# Patient Record
Sex: FEMALE | URBAN - METROPOLITAN AREA
[De-identification: names, ages, dates, MRNs, and addresses within clinical notes are randomized per-mention and may not be internally consistent; named-entity substitution may affect disease eponyms.]

---

## 2023-07-07 ENCOUNTER — CLINICAL DOCUMENTATION (OUTPATIENT)
Dept: PHARMACY | Facility: CLINIC | Age: 33
End: 2023-07-07

## 2023-07-07 NOTE — PROGRESS NOTES
Pharmacy Pop Care Documentation:   Patient is missing the following requirements: DX: DM Type One or Type Two; Provider Documentation of DM Visit; A1C    If completed by 07/25/23 patient will be eligible for enrollment in the DM Program on 08/01/23. Application Received: 6/5/59  Application scanned. Letter mailed to patient.       Fracisco Bess   Phone: [phone]       For Pharmacy Admin Tracking Only    Program: Mike in place:  No  Gap Closed?: No   Time Spent (min): 15